# Patient Record
Sex: FEMALE | Race: WHITE | Employment: FULL TIME | URBAN - METROPOLITAN AREA
[De-identification: names, ages, dates, MRNs, and addresses within clinical notes are randomized per-mention and may not be internally consistent; named-entity substitution may affect disease eponyms.]

---

## 2021-05-05 ENCOUNTER — TELEPHONE (OUTPATIENT)
Dept: FAMILY MEDICINE CLINIC | Age: 61
End: 2021-05-05

## 2021-05-05 NOTE — TELEPHONE ENCOUNTER
Pt called requesting NP appt with Dr. Indigo Flood. States she's new to area from [place]. Seeking to establish care and get refills asap for HTN meds and wellbutrin. Has Bri.  [address]: [phone]

## 2025-02-27 NOTE — TELEPHONE ENCOUNTER
Patient was called first available for Gregoria Bustos was offered as there was no recent new pt appointments for Dr. Lida Butts.  Pt declined first available and stated she will look for another provider English